# Patient Record
Sex: MALE | Race: WHITE | Employment: FULL TIME | URBAN - METROPOLITAN AREA
[De-identification: names, ages, dates, MRNs, and addresses within clinical notes are randomized per-mention and may not be internally consistent; named-entity substitution may affect disease eponyms.]

---

## 2017-03-24 PROBLEM — J32.4 CHRONIC PANSINUSITIS: Status: ACTIVE | Noted: 2017-03-24

## 2017-03-24 PROBLEM — J34.2 NASAL SEPTAL DEVIATION: Status: ACTIVE | Noted: 2017-03-24

## 2017-03-24 PROBLEM — Q30.9 NASAL DEFORMITY, CONGENITAL: Status: ACTIVE | Noted: 2017-03-24

## 2018-07-02 ENCOUNTER — TELEPHONE (OUTPATIENT)
Dept: ENT CLINIC | Age: 38
End: 2018-07-02

## 2018-07-02 NOTE — TELEPHONE ENCOUNTER
Patients mother lm at office stating patient needs a refill on a medication but she did not know what the medication was so we would have to look at his medication list. Asked to be called back at [phone]    Returned call, spoke with patient's mother and advised we would need to know the name of the medication he needs refilled as there is no history of the provider sending in any medications except what patient was given after surgery. She stated that the patient works until [employer] and is unable to come in even though he needs to since he will not be able to get off work. But she will ask the patient what the med is and will call back in.

## 2020-06-02 ENCOUNTER — HOSPITAL ENCOUNTER (EMERGENCY)
Age: 40
Discharge: HOME | End: 2020-06-02

## 2020-06-02 VITALS
DIASTOLIC BLOOD PRESSURE: 74 MMHG | HEART RATE: 62 BPM | TEMPERATURE: 97.5 F | OXYGEN SATURATION: 100 % | SYSTOLIC BLOOD PRESSURE: 132 MMHG | RESPIRATION RATE: 18 BRPM

## 2020-06-02 PROCEDURE — 10060 I&D ABSCESS SIMPLE/SINGLE: CPT

## 2020-06-02 PROCEDURE — 99282 EMERGENCY DEPT VISIT SF MDM: CPT

## 2020-06-02 ASSESSMENT — ENCOUNTER SYMPTOMS
SHORTNESS OF BREATH: 0
CHEST TIGHTNESS: 0
COUGH: 0
COLOR CHANGE: 0

## 2020-06-02 ASSESSMENT — PAIN DESCRIPTION - PROGRESSION: CLINICAL_PROGRESSION: GRADUALLY WORSENING

## 2020-06-02 ASSESSMENT — PAIN DESCRIPTION - DESCRIPTORS: DESCRIPTORS: SHARP

## 2020-06-02 ASSESSMENT — PAIN DESCRIPTION - ONSET: ONSET: ON-GOING

## 2020-06-02 ASSESSMENT — PAIN - FUNCTIONAL ASSESSMENT: PAIN_FUNCTIONAL_ASSESSMENT: ACTIVITIES ARE NOT PREVENTED

## 2020-06-02 ASSESSMENT — PAIN DESCRIPTION - LOCATION: LOCATION: BACK

## 2020-06-02 ASSESSMENT — PAIN DESCRIPTION - FREQUENCY: FREQUENCY: CONTINUOUS

## 2020-06-02 ASSESSMENT — PAIN SCALES - GENERAL: PAINLEVEL_OUTOF10: 3

## 2020-06-02 NOTE — ED PROVIDER NOTES
[facility]        Department of Emergency Medicine   ED  Provider Note  Admit Date/RoomTime: 6/2/2020  6:14 PM  ED Room: RONNEI/RONNIE  HPI:  6/2/20, Time: 7:30 PM EDT      45 yo male presenting to the ED with a cyst to his left upper back. Patient states he has had a cyst in this area for many years. He states he had to drain it one other time. It has not bothered him in several months but over the last few weeks it is started to swell up again. He states that it is only painful when he leans up against something or when his clothes irritate it. He has not noticed any drainage from it. He was told that he would likely come back after it was drained the first time due to the capsule not being removed. He denies any recent drainage, bleeding, fever/chills, myalgias. The history is provided by the patient. No  was used. REVIEW OF SYSTEMS:  Review of Systems   Constitutional: Negative for activity change, chills, fatigue and fever. HENT: Negative for dental problem. Respiratory: Negative for cough, chest tightness and shortness of breath. Cardiovascular: Negative for chest pain, palpitations and leg swelling. Musculoskeletal: Negative for arthralgias, myalgias, neck pain and neck stiffness. Skin: Positive for wound. Negative for color change, pallor and rash. Cyst to left upper back. Neurological: Negative for dizziness, weakness, light-headedness and headaches. Hematological: Does not bruise/bleed easily. Psychiatric/Behavioral: Negative for agitation, behavioral problems and confusion. Pertinent positives and negatives are stated within HPI, all other systems reviewed and are negative.      --------------------------------------------- PAST HISTORY ---------------------------------------------  Past Medical History:  has no past medical history on file.     Past Surgical History:  has a past surgical history that includes fracture surgery (2007); Tonsillectomy; Myringotomy Tympanostomy Tube Placement; and Nose surgery (Bilateral, 04/27/2017). Social History:  reports that he has never smoked. He has never used smokeless tobacco. He reports that he does not drink alcohol or use drugs. Family History: family history is not on file. The patients home medications have been reviewed. Allergies: Other    -------------------------------------------------- RESULTS -------------------------------------------------  All laboratory and radiology results have been personally reviewed by myself   LABS:  No results found for this visit on 06/02/20. RADIOLOGY:  Interpreted by Radiologist.  No orders to display       ------------------------- NURSING NOTES AND VITALS REVIEWED ---------------------------   The nursing notes within the ED encounter and vital signs as below have been reviewed. /74   Pulse 62   Temp 97.5 °F (36.4 °C) (Temporal)   Resp 18   SpO2 100%   Oxygen Saturation Interpretation: Normal      ---------------------------------------------------PHYSICAL EXAM--------------------------------------    Physical Exam  Vitals signs and nursing note reviewed. Constitutional:       General: He is not in acute distress. Appearance: Normal appearance. He is well-developed. He is not ill-appearing. Neck:      Musculoskeletal: Normal range of motion and neck supple. No neck rigidity. Cardiovascular:      Rate and Rhythm: Normal rate and regular rhythm. Heart sounds: Normal heart sounds. No murmur. Pulmonary:      Effort: Pulmonary effort is normal. No respiratory distress. Breath sounds: Normal breath sounds. Musculoskeletal: Normal range of motion. General: No swelling, tenderness or deformity. Comments: 3 x 3 cm circular/firm mass just to the left of the upper thoracic spine. Tiny central area of fluctuance. No erythema or induration surrounding the tiny area of fluctuance.    Skin:     General: Skin is

## 2021-11-12 ENCOUNTER — HOSPITAL ENCOUNTER (EMERGENCY)
Age: 41
Discharge: HOME | End: 2021-11-12
Payer: COMMERCIAL

## 2021-11-12 VITALS
WEIGHT: 230 LBS | RESPIRATION RATE: 20 BRPM | HEIGHT: 72 IN | TEMPERATURE: 98.1 F | DIASTOLIC BLOOD PRESSURE: 82 MMHG | BODY MASS INDEX: 31.15 KG/M2 | HEART RATE: 74 BPM | SYSTOLIC BLOOD PRESSURE: 120 MMHG | OXYGEN SATURATION: 98 %

## 2021-11-12 DIAGNOSIS — J01.90 ACUTE SINUSITIS, RECURRENCE NOT SPECIFIED, UNSPECIFIED LOCATION: Primary | ICD-10-CM

## 2021-11-12 DIAGNOSIS — H66.90 ACUTE OTITIS MEDIA, UNSPECIFIED OTITIS MEDIA TYPE: ICD-10-CM

## 2021-11-12 PROCEDURE — 99211 OFF/OP EST MAY X REQ PHY/QHP: CPT

## 2021-11-12 RX ORDER — AMOXICILLIN AND CLAVULANATE POTASSIUM 875; 125 MG/1; MG/1
1 TABLET, FILM COATED ORAL 2 TIMES DAILY
Qty: 14 TABLET | Refills: 0 | Status: SHIPPED | OUTPATIENT
Start: 2021-11-12 | End: 2021-11-19

## 2021-11-12 NOTE — ED PROVIDER NOTES
Department of Emergency [address] [facility]  Provider Note  Admit Date/RoomTime: 2021  5:09 PM  Room:   NAME: Aparna Flynn  : 1980  MRN: [id number]     Chief Complaint:  Sinusitis (started  4 days ago sinus pressure  ear ache  ), Cough, Otalgia, Headache, and Groin Pain (started  today right groin  pain   after lifting)    History of Present Illness       Aparna Flynn is a 37 y.o. old male who presents to the emergency department evaluation he has pressure especially on the left side of his sinuses on the forehead and the cheek and also both ears feel like they are full of pressure. He has a slight cough does not have any loss of taste or smell fever chills body aches chest pain, loss to taste or smell,  or shortness of breath. ROS    Pertinent positives and negatives are stated within HPI, all other systems reviewed and are negative. Past Surgical History:   Procedure Laterality Date    FRACTURE SURGERY      ankle     MYRINGOTOMY AND TYMPANOSTOMY TUBE PLACEMENT      NOSE SURGERY Bilateral 2017    nasal alar reconstruction , bilateral functional endoscopic sinus surgery/ septoplasty    TONSILLECTOMY     Social History:  reports that he has never smoked. He has never used smokeless tobacco. He reports that he does not drink alcohol and does not use drugs. Family History: family history is not on file. Allergies: Other    Physical Exam            ED Triage Vitals   BP Temp Temp Source Pulse Resp SpO2 Height Weight   21 1716 21 1716 21 1716 21 1716 21 1716 21 1716 21 1710 21 1710   120/82 98.1 °F (36.7 °C) Infrared 74 20 98 % 6' (1.829 m) 230 lb (104.3 kg)      Oxygen Saturation Interpretation: Normal.    Constitutional:  Alert, development consistent with age.   Ears:  External Ears: left Tm is normal, right is erythematous, no pharyngeal erythema,              TM's & External Canals: normal appearance, normal TMs bilaterally. Nose:   There is no discharge, swelling or lesions noted. Sinuses: mild Left maxillary sinus tenderness. mild Left frontal sinus tenderness. Mouth:  normal tongue and buccal mucosa. Throat: mild erythema. Airway Patent. Neck/Lymphatics:  Neck Supple. Respiratory:    Lung sounds: normal.   CV:  Regular rate and rhythm, normal heart sounds, without pathological murmurs, ectopy, gallops, or rubs. GI:  Abdomen Soft, nontender, good bowel sounds. No firm or pulsatile mass. Integument:  Normal turgor. Neurological:  Oriented. Motor functions intact. Lab / Imaging Results   (All laboratory and radiology results have been personally reviewed by myself)  Labs:  No results found for this visit on 11/12/21. Imaging: All Radiology results interpreted by Radiologist unless otherwise noted. No orders to display     ED Course / Medical Decision Making   Medications - No data to display       MDM:   I did put him on Augmentin he can take over-the-counter cold medicine as needed Tylenol or ibuprofen as needed and follow-up with his PCP    1. Acute sinusitis, recurrence not specified, unspecified location    2. Acute otitis media, unspecified otitis media type      Plan   Discharge to home and advised to contact [address]  [phone]    Schedule an appointment as soon as possible for a visit      Patient condition is good    New Medications     New Prescriptions    AMOXICILLIN-CLAVULANATE (AUGMENTIN) 875-125 MG PER TABLET    Take 1 tablet by mouth 2 times daily for 7 days     Electronically signed by CAYDEN Romano CNP   DD: 11/12/21  **This report was transcribed using voice recognition software. Every effort was made to ensure accuracy; however, inadvertent computerized transcription errors may be present.   END OF ED PROVIDER NOTE     CAYDEN Romano CNP  11/12/21 8825

## 2021-11-22 ENCOUNTER — HOSPITAL ENCOUNTER (EMERGENCY)
Age: 41
Discharge: HOME | End: 2021-11-22
Payer: COMMERCIAL

## 2021-11-22 VITALS
OXYGEN SATURATION: 97 % | HEIGHT: 72 IN | HEART RATE: 66 BPM | DIASTOLIC BLOOD PRESSURE: 92 MMHG | WEIGHT: 238 LBS | BODY MASS INDEX: 32.23 KG/M2 | SYSTOLIC BLOOD PRESSURE: 129 MMHG | TEMPERATURE: 97.7 F | RESPIRATION RATE: 18 BRPM

## 2021-11-22 DIAGNOSIS — J01.90 ACUTE SINUSITIS, RECURRENCE NOT SPECIFIED, UNSPECIFIED LOCATION: Primary | ICD-10-CM

## 2021-11-22 PROCEDURE — 99211 OFF/OP EST MAY X REQ PHY/QHP: CPT

## 2021-11-22 RX ORDER — AZITHROMYCIN 250 MG/1
TABLET, FILM COATED ORAL
Qty: 6 TABLET | Refills: 0 | Status: SHIPPED | OUTPATIENT
Start: 2021-11-22 | End: 2021-12-02

## 2021-11-22 ASSESSMENT — PAIN DESCRIPTION - LOCATION: LOCATION: EAR

## 2021-11-22 ASSESSMENT — PAIN DESCRIPTION - ORIENTATION: ORIENTATION: RIGHT

## 2021-11-22 ASSESSMENT — PAIN DESCRIPTION - PAIN TYPE: TYPE: ACUTE PAIN

## 2021-11-22 ASSESSMENT — PAIN SCALES - GENERAL: PAINLEVEL_OUTOF10: 2

## 2021-11-22 ASSESSMENT — PAIN DESCRIPTION - FREQUENCY: FREQUENCY: CONTINUOUS

## 2021-11-22 ASSESSMENT — PAIN DESCRIPTION - ONSET: ONSET: GRADUAL

## 2021-11-22 ASSESSMENT — PAIN DESCRIPTION - DESCRIPTORS: DESCRIPTORS: DISCOMFORT

## 2021-11-22 ASSESSMENT — PAIN DESCRIPTION - PROGRESSION: CLINICAL_PROGRESSION: GRADUALLY WORSENING

## 2021-11-22 NOTE — ED PROVIDER NOTES
Department of Emergency . [facility] [address] [facility]  Provider Note  Admit Date/RoomTime: 2021  6:08 PM  Room:   NAME: Julieth Peck  : 1980  MRN: [id number]     Chief Complaint:  Nasal Congestion, Otalgia (Right ear. Was here and put on Amoxicillin for ear infection. . Also saw PCP and was put on Medrol Dose Pack.), and Cough (Having sinus drainage.)    History of Present Illness       Julieth Peck is a 37 y.o. old male who presents to the emergency department with congestion sinus pressure and congestion and pain in the ears pressure in the ears. He was seen here 2 weeks ago put on Augmentin and Mucinex D he said that did help but as soon as he finished the Augmentin he probably started to come back but he is also out of the Mucinex D. He saw his doctor also in the meantime and is just finishing a Medrol Dosepak he took the last dose this morning. He is not complaining of cough chest pain or shortness of breath not complaining of fever chills or body aches. ROS    Pertinent positives and negatives are stated within HPI, all other systems reviewed and are negative. Past Surgical History:   Procedure Laterality Date    FRACTURE SURGERY      ankle     MYRINGOTOMY AND TYMPANOSTOMY TUBE PLACEMENT      NOSE SURGERY Bilateral 2017    nasal alar reconstruction , bilateral functional endoscopic sinus surgery/ septoplasty    TONSILLECTOMY     Social History:  reports that he has never smoked. He has never used smokeless tobacco. He reports that he does not drink alcohol and does not use drugs. Family History: family history is not on file. Allergies:  Other    Physical Exam            ED Triage Vitals [21 1815]   BP Temp Temp Source Pulse Resp SpO2 Height Weight   (!) 129/92 97.7 °F (36.5 °C) Infrared 66 18 97 % 6' (1.829 m) 238 lb (108 kg)      Oxygen Saturation Interpretation: Normal.    Constitutional:  Alert, development consistent with age.  Ears:  External Ears: Bilateral normal.               TM's & External Canals: both TM's erythematous and air-fluid level. Nose:   There is no discharge, swelling or lesions noted. Sinuses: mild Bilateral maxillary sinus tenderness. no Bilateral frontal sinus tenderness. Mouth:  normal tongue and buccal mucosa. Neck/Lymphatics:  Neck Supple. Respiratory:    Lung sounds: normal.   CV:  Regular rate and rhythm, normal heart sounds, without pathological murmurs, ectopy, gallops, or rubs. Integument:    Warm, dry, without visible rash. Neurological:  Oriented. Motor functions intact. Lab / Imaging Results   (All laboratory and radiology results have been personally reviewed by myself)  Labs:  No results found for this visit on 11/22/21. Imaging: All Radiology results interpreted by Radiologist unless otherwise noted. No orders to display     ED Course / Medical Decision Making   Medications - No data to display       MDM:   {Is having recurring this recurrence of sinus symptoms and ear pressure. He was on Augmentin he said that cleared it up but it started to come back he said a Z-Reymundo always worked well for him in the past he just finished a Medrol Dosepak this morning. He is requesting a shot of Decadron however he said the steroid pack did nothing. I did tell him that I could not give him any more steroid at this point since he was still on the medrol pack this am  I did put him on a Z-Reymundo and also some Claritin-D advised him to follow-up with his doctor.     1. Acute sinusitis, recurrence not specified, unspecified location      Plan   Discharge to home and advised to contact Lizzie Jin DO  [address] [place] [id number] [phone]    Schedule an appointment as soon as possible for a visit      Patient condition is good    New Medications     New Prescriptions    AZITHROMYCIN (ZITHROMAX Z-REYMUNDO) 250 MG TABLET    Take 2 tabs on day one, followed by 1 tablet daily for 4 days. LORATADINE-PSEUDOEPHEDRINE (CLARITIN-D 12HR) 5-120 MG PER EXTENDED RELEASE TABLET    Take 1 tablet by mouth 2 times daily as needed (congestion)     Electronically signed by CAYDEN León CNP   DD: 11/22/21  **This report was transcribed using voice recognition software. Every effort was made to ensure accuracy; however, inadvertent computerized transcription errors may be present.   END OF ED PROVIDER NOTE     CAYDEN León CNP  11/22/21 1433